# Patient Record
Sex: MALE | Race: WHITE
[De-identification: names, ages, dates, MRNs, and addresses within clinical notes are randomized per-mention and may not be internally consistent; named-entity substitution may affect disease eponyms.]

---

## 2017-06-04 ENCOUNTER — HOSPITAL ENCOUNTER (EMERGENCY)
Dept: HOSPITAL 57 - BURERS | Age: 27
Discharge: HOME | End: 2017-06-04
Payer: COMMERCIAL

## 2017-06-04 DIAGNOSIS — A08.4: Primary | ICD-10-CM

## 2017-06-04 LAB
ALBUMIN SERPL BCG-MCNC: 4.7 G/DL (ref 3.5–5)
ALP SERPL-CCNC: 93 U/L (ref 40–150)
ALT SERPL W P-5'-P-CCNC: 19 U/L (ref 8–55)
ANION GAP SERPL CALC-SCNC: 21 MMOL/L (ref 10–20)
AST SERPL-CCNC: 14 U/L (ref 5–34)
BILIRUB SERPL-MCNC: 1.2 MG/DL (ref 0.2–1.2)
BUN SERPL-MCNC: 13 MG/DL (ref 8.9–20.6)
CALCIUM SERPL-MCNC: 8.5 MG/DL (ref 7.8–10.44)
CHLORIDE SERPL-SCNC: 106 MMOL/L (ref 98–107)
CO2 SERPL-SCNC: 16 MMOL/L (ref 22–29)
CREAT CL PREDICTED SERPL C-G-VRATE: 0 ML/MIN (ref 70–130)
GLOBULIN SER CALC-MCNC: 2.6 G/DL (ref 2.4–3.5)
GLUCOSE SERPL-MCNC: 132 MG/DL (ref 70–105)
HGB BLD-MCNC: 17.2 G/DL (ref 14–18)
LIPASE SERPL-CCNC: 14 U/L (ref 8–78)
MCH RBC QN AUTO: 31 PG (ref 27–31)
MCV RBC AUTO: 86.6 FL (ref 80–94)
MDIFF COMPLETE?: YES
PLATELET # BLD AUTO: 183 THOU/UL (ref 130–400)
PLATELET BLD QL SMEAR: (no result)
POTASSIUM SERPL-SCNC: 3.5 MMOL/L (ref 3.5–5.1)
RBC # BLD AUTO: 5.55 MILL/UL (ref 4.7–6.1)
SODIUM SERPL-SCNC: 139 MMOL/L (ref 136–145)
WBC # BLD AUTO: 7.2 THOU/UL (ref 4.8–10.8)

## 2017-06-04 PROCEDURE — 96375 TX/PRO/DX INJ NEW DRUG ADDON: CPT

## 2017-06-04 PROCEDURE — 85025 COMPLETE CBC W/AUTO DIFF WBC: CPT

## 2017-06-04 PROCEDURE — 96361 HYDRATE IV INFUSION ADD-ON: CPT

## 2017-06-04 PROCEDURE — 74022 RADEX COMPL AQT ABD SERIES: CPT

## 2017-06-04 PROCEDURE — 96374 THER/PROPH/DIAG INJ IV PUSH: CPT

## 2017-06-04 PROCEDURE — 93005 ELECTROCARDIOGRAM TRACING: CPT

## 2017-06-04 PROCEDURE — 36415 COLL VENOUS BLD VENIPUNCTURE: CPT

## 2017-06-04 PROCEDURE — 94760 N-INVAS EAR/PLS OXIMETRY 1: CPT

## 2017-06-04 PROCEDURE — 83690 ASSAY OF LIPASE: CPT

## 2017-06-04 PROCEDURE — 80053 COMPREHEN METABOLIC PANEL: CPT

## 2017-06-04 NOTE — RAD
TWO VIEWS ABDOMEN 

UPRIGHT CHEST:

 

Comparison: None. 

 

History: Nausea, vomiting. 

 

FINDINGS: 

Supine and upright views of the abdomen and upright chest shows a nonspecific, nonobstructed pattern
. Air is seen throughout the colon. No free air is seen on upright examinations. 

 

Cardiomediastinal silhouette is normal in size. There is no evidence of mass, consolidation, or pleu
ral effusion. 

 

IMPRESSION: 

Nonobstructed bowel gas pattern.

 

POS: [facility]

## 2018-10-14 ENCOUNTER — HOSPITAL ENCOUNTER (EMERGENCY)
Dept: HOSPITAL 92 - ERS | Age: 28
Discharge: HOME | End: 2018-10-14
Payer: COMMERCIAL

## 2018-10-14 DIAGNOSIS — J02.9: Primary | ICD-10-CM

## 2018-10-14 DIAGNOSIS — F17.220: ICD-10-CM

## 2018-10-14 DIAGNOSIS — J06.9: ICD-10-CM

## 2018-10-14 PROCEDURE — 99283 EMERGENCY DEPT VISIT LOW MDM: CPT

## 2018-10-14 PROCEDURE — 87804 INFLUENZA ASSAY W/OPTIC: CPT
